# Patient Record
Sex: MALE | Race: WHITE | Employment: OTHER | URBAN - METROPOLITAN AREA
[De-identification: names, ages, dates, MRNs, and addresses within clinical notes are randomized per-mention and may not be internally consistent; named-entity substitution may affect disease eponyms.]

---

## 2023-01-07 ENCOUNTER — APPOINTMENT (OUTPATIENT)
Dept: GENERAL RADIOLOGY | Age: 68
End: 2023-01-07
Payer: COMMERCIAL

## 2023-01-07 ENCOUNTER — HOSPITAL ENCOUNTER (EMERGENCY)
Age: 68
Discharge: HOME | End: 2023-01-07
Attending: EMERGENCY MEDICINE
Payer: COMMERCIAL

## 2023-01-07 VITALS
SYSTOLIC BLOOD PRESSURE: 124 MMHG | TEMPERATURE: 97.8 F | DIASTOLIC BLOOD PRESSURE: 71 MMHG | WEIGHT: 225 LBS | RESPIRATION RATE: 18 BRPM | OXYGEN SATURATION: 93 % | HEART RATE: 59 BPM

## 2023-01-07 DIAGNOSIS — S73.005A DISLOCATION OF LEFT HIP, INITIAL ENCOUNTER (HCC): Primary | ICD-10-CM

## 2023-01-07 PROCEDURE — 2580000003 HC RX 258: Performed by: STUDENT IN AN ORGANIZED HEALTH CARE EDUCATION/TRAINING PROGRAM

## 2023-01-07 PROCEDURE — 73502 X-RAY EXAM HIP UNI 2-3 VIEWS: CPT

## 2023-01-07 PROCEDURE — 96376 TX/PRO/DX INJ SAME DRUG ADON: CPT

## 2023-01-07 PROCEDURE — 6360000002 HC RX W HCPCS: Performed by: STUDENT IN AN ORGANIZED HEALTH CARE EDUCATION/TRAINING PROGRAM

## 2023-01-07 PROCEDURE — 73501 X-RAY EXAM HIP UNI 1 VIEW: CPT

## 2023-01-07 PROCEDURE — 2500000003 HC RX 250 WO HCPCS: Performed by: STUDENT IN AN ORGANIZED HEALTH CARE EDUCATION/TRAINING PROGRAM

## 2023-01-07 PROCEDURE — 99285 EMERGENCY DEPT VISIT HI MDM: CPT

## 2023-01-07 PROCEDURE — 96374 THER/PROPH/DIAG INJ IV PUSH: CPT

## 2023-01-07 PROCEDURE — 96375 TX/PRO/DX INJ NEW DRUG ADDON: CPT

## 2023-01-07 PROCEDURE — 73552 X-RAY EXAM OF FEMUR 2/>: CPT

## 2023-01-07 PROCEDURE — 2500000003 HC RX 250 WO HCPCS

## 2023-01-07 RX ORDER — KETAMINE HYDROCHLORIDE 10 MG/ML
50 INJECTION INTRAMUSCULAR; INTRAVENOUS ONCE
Status: COMPLETED | OUTPATIENT
Start: 2023-01-07 | End: 2023-01-07

## 2023-01-07 RX ORDER — ONDANSETRON 2 MG/ML
4 INJECTION INTRAMUSCULAR; INTRAVENOUS ONCE
Status: COMPLETED | OUTPATIENT
Start: 2023-01-07 | End: 2023-01-07

## 2023-01-07 RX ORDER — 0.9 % SODIUM CHLORIDE 0.9 %
1000 INTRAVENOUS SOLUTION INTRAVENOUS ONCE
Status: COMPLETED | OUTPATIENT
Start: 2023-01-07 | End: 2023-01-07

## 2023-01-07 RX ORDER — FENTANYL CITRATE 50 UG/ML
100 INJECTION, SOLUTION INTRAMUSCULAR; INTRAVENOUS ONCE
Status: COMPLETED | OUTPATIENT
Start: 2023-01-07 | End: 2023-01-07

## 2023-01-07 RX ORDER — MIDAZOLAM HYDROCHLORIDE 2 MG/2ML
2 INJECTION, SOLUTION INTRAMUSCULAR; INTRAVENOUS ONCE
Status: COMPLETED | OUTPATIENT
Start: 2023-01-07 | End: 2023-01-07

## 2023-01-07 RX ORDER — PROPOFOL 10 MG/ML
200 INJECTION, EMULSION INTRAVENOUS CONTINUOUS
Status: DISCONTINUED | OUTPATIENT
Start: 2023-01-07 | End: 2023-01-07 | Stop reason: HOSPADM

## 2023-01-07 RX ORDER — KETAMINE HYDROCHLORIDE 50 MG/ML
INJECTION, SOLUTION, CONCENTRATE INTRAMUSCULAR; INTRAVENOUS
Status: COMPLETED
Start: 2023-01-07 | End: 2023-01-07

## 2023-01-07 RX ORDER — MORPHINE SULFATE 4 MG/ML
4 INJECTION, SOLUTION INTRAMUSCULAR; INTRAVENOUS ONCE
Status: COMPLETED | OUTPATIENT
Start: 2023-01-07 | End: 2023-01-07

## 2023-01-07 RX ADMIN — MORPHINE SULFATE 4 MG: 4 INJECTION INTRAVENOUS at 17:40

## 2023-01-07 RX ADMIN — FENTANYL CITRATE 100 MCG: 50 INJECTION, SOLUTION INTRAMUSCULAR; INTRAVENOUS at 17:37

## 2023-01-07 RX ADMIN — HYDROMORPHONE HYDROCHLORIDE 0.5 MG: 1 INJECTION, SOLUTION INTRAMUSCULAR; INTRAVENOUS; SUBCUTANEOUS at 17:58

## 2023-01-07 RX ADMIN — ONDANSETRON 4 MG: 2 INJECTION INTRAMUSCULAR; INTRAVENOUS at 17:40

## 2023-01-07 RX ADMIN — KETAMINE HYDROCHLORIDE: 50 INJECTION INTRAMUSCULAR; INTRAVENOUS at 20:34

## 2023-01-07 RX ADMIN — HYDROMORPHONE HYDROCHLORIDE 0.5 MG: 1 INJECTION, SOLUTION INTRAMUSCULAR; INTRAVENOUS; SUBCUTANEOUS at 19:37

## 2023-01-07 RX ADMIN — SODIUM CHLORIDE 1000 ML: 9 INJECTION, SOLUTION INTRAVENOUS at 18:18

## 2023-01-07 RX ADMIN — MIDAZOLAM 2 MG: 1 INJECTION INTRAMUSCULAR; INTRAVENOUS at 17:55

## 2023-01-07 RX ADMIN — HYDROMORPHONE HYDROCHLORIDE 0.5 MG: 1 INJECTION, SOLUTION INTRAMUSCULAR; INTRAVENOUS; SUBCUTANEOUS at 18:32

## 2023-01-07 RX ADMIN — PROPOFOL 150 MG: 10 INJECTION, EMULSION INTRAVENOUS at 20:25

## 2023-01-07 RX ADMIN — KETAMINE HYDROCHLORIDE 50 MG: 10 INJECTION INTRAMUSCULAR; INTRAVENOUS at 20:33

## 2023-01-07 ASSESSMENT — PAIN SCALES - GENERAL
PAINLEVEL_OUTOF10: 10

## 2023-01-07 NOTE — ED NOTES
Pt to room 21 via EMS with left hip pain. Pt reports that he was bending down to get coffee and felt his hip pop. Pt reports that he has had hip replacement surgery on his left hip. Pt reports pain to left hip. Pt received 150mcg fentanyl PTA by EMS. Pt alert and oriented x4, talking in complete sentences, respirations even and unlabored. Pt acting age appropriate.  White board updated, will continue to plan of care       Rj Valentin RN  01/07/23 1993

## 2023-01-07 NOTE — CONSULTS
Orthopaedic Surgery Consult  (Dr. Alaina Molina)      CC/Reason for consult:  Left prosthetic Hip Dislocation    HPI:      The patient is a 74 y.o. right hand-dominant male who presents to the emergency department after sustaining a fall. Patient was transferred to the emergency department where radiographs demonstrated a left prosthetic hip dislocation for which we are consulted. Patient seen and evaluated in the emergency department with wife at bedside. Patient states that he was bending down to clean up coffee that he had spilled when he felt his left hip give way and he fell to the floor. Patient denies hitting his head. He denies loss of consciousness. He denies numbness and tingling into his left lower extremity. He denies pain elsewhere in his body. In September patient had a left total hip arthroplasty with Dr. Nasir Kitchen. Patient states he had a right total hip arthroplasty with Dr. Nasir Kitchen approximately 18 years ago. Patient had a left rotator cuff repair completed in October 2022 by Dr. Nasir Kitchen. Patient has a past medical history of COPD, hypertension. Patient takes aspirin 81 mg once daily. At baseline patient is community ambulator without aid. Past Medical History:    No past medical history on file. Past Surgical History:    No past surgical history on file. Medications Prior to Admission:   Prior to Admission medications    Not on File     Allergies:    Patient has no allergy information on record. Social History:   Social History     Socioeconomic History    Marital status:      Family History:  No family history on file. ROS:   Constitutional: Negative for fever and chills. Respiratory: Negative for cough. Cardiovascular: Negative for chest pain. Musculoskeletal: Positive for arthralgias and myalgias of the left lower extremity. Skin: Negative for itching and rash. Neurological: Negative for numbness, tingling, weakness.      PE:  Blood pressure (!) 116/95, pulse 59, temperature 97.8 °F (36.6 °C), resp. rate 18, SpO2 98 %. Gen: Alert and oriented, NAD, cooperative but in pain. Head: Normocephalic, atraumatic. Cardiovascular: regular rate. Respiratory: Chest symmetric, no accessory muscle use. Pelvis: Stability deferred due to hip dislocation. RUE: Skin intact. No ecchymoses, abrasion, deformity, or lacerations. Non tender to palpation. No crepitus. Compartments soft and easily compressible. Full ROM at shoulder with mild pain which is not new. Full ROM at elbow without pain. Ulnar/median/AIN/PIN/radial motor intact. Axillary/MCN/median/ulnar/radial nerves SILT. Radial pulse 2+ with BCR.    LUE: Skin intact. No ecchymoses, abrasion, deformity, or lacerations. Non tender to palpation. No crepitus. Compartments soft and easily compressible. ROM of shoulder deferred due to RTC repair in October. Non tender to palpation about left shoulder. Full ROM at elbow without pain. Ulnar/median/AIN/PIN/radial motor intact. Axillary/MCN/median/ulnar/radial nerves SILT. Radial pulse 2+ with BCR. RLE: Skin intact. No ecchymoses, abrasions, deformity, or lacerations. Non tender to palpation. No crepitus. Compartments soft and easily compressible. EHL/FHL/TA/GS complex motor intact. Sural/saphenous/SPN/DPN/plantar nerve distribution SILT. Patient has no groin pain with log roll maneuver. Knee ligamentous exam deferred due to patient positioning and extreme pain. DP and PT pulses 2+ with BCR. LLE: Skin intact. No ecchymoses, abrasions, deformity, or lacerations. Extremity resting in adducted and internally rotated positioning. Extremely tender to palpation and to slight movement about the hip . No crepitus. Compartments soft and easily compressible. EHL/FHL/TA/GS complex motor intact. Sural/saphenous/SPN/DPN/plantar nerve distribution SILT. Log roll and knee ligamentous exam deferred due to dislocation. DP and PT pulses 2+ with BCR.      Labs:  No results for input(s): WBC, HGB, HCT, PLT, INR, PTT, NA, K, BUN, CREATININE, GLUCOSE, SEDRATE, CRP in the last 72 hours. Invalid input(s): PT     Imaging:   Four radiographic views of the left hip and femur, AP, cross table Lateral demonstrating a periprosthetic hip dislocation. There is no loosing or fracture surrounding the implant. Assessment/Plan: 74 y.o. male who being seen for:    -Left periprosthetic hip dislocation s/p closed reduction    - Closed reduction with conscious sedation in ED  - Knee immobilizer applied to LLE. Please maintain LLE knee immobilizer at all times. - Hip precautions: No hip flexion, adduction, internal rotation. No excessive hip extension, abduction or external rotation. -WB status: WBAT  -Diet: Okay for diet from orthopedic surgery standpoint  -Pain control per Emergency Department  -Ice extremity for pain and swelling  -Dispo: Per ED  -Follow up outpatient with Dr. Cinthia Dominguez  -Please contact ortho with any questions    Procedure Note: Risks, benefits, and alternatives were discussed with the patient, and verbal consent was obtained for procedural sedation with  propofol and ketamine  per the ED staff with reduction of a Left periprosthetic hip replacement. Once adequate sedation had been achieved, reduction maneuver was performed and Hip position was confirmed on X-ray. A Knee immobilizer splint was applied. Patient tolerated procedure well. Torri Childs MD  Resident Physician, PGY-1   Orthopaedic Surgery  6:44 PM 1/7/2023    PGY-3 Addendum    Patient seen and examined. Agree with subjective and objective portions from Dr. Ivory Wright. The patient is a 74 y.o. male with the injuries as listed above. Close reduction under sedation was performed, patient tolerated procedure well. NVI after procedure. Maintain knee immobilizer on. Follow hip precautions as listed above. Ok to discharge and follow up with Dr. Cinthia Dominguez.      Electronically signed by Joann Owen DO 9:12 PM 1/7/2023

## 2023-01-07 NOTE — ED PROVIDER NOTES
[facility]  Emergency Department  Faculty Attestation     I performed a history and physical examination of the patient and discussed management with the resident. I reviewed the residents note and agree with the documented findings and plan of care. Any areas of disagreement are noted on the chart. I was personally present for the key portions of any procedures. I have documented in the chart those procedures where I was not present during the key portions. I have reviewed the emergency nurses triage note. I agree with the chief complaint, past medical history, past surgical history, allergies, medications, social and family history as documented unless otherwise noted below. For Physician Assistant/ Nurse Practitioner cases/documentation I have personally evaluated this patient and have completed at least one if not all key elements of the E/M (history, physical exam, and MDM). Additional findings are as noted. Primary Care Physician:  Lou Turner DO    Screenings:  [unfilled]    CHIEF COMPLAINT       Chief Complaint   Patient presents with    Hip Pain       RECENT VITALS:   Temp: 97.8 °F (36.6 °C),  Heart Rate: 60, Resp: 18, BP: (!) 116/95    LABS:  Labs Reviewed - No data to display    Radiology  XR HIP LEFT (2-3 VIEWS)    (Results Pending)         Attending Physician Additional  Notes    Patient is brought by EMS for severe left hip pain with shortening and internal rotation. He has prior bilateral hip replacements. He was bending over and felt a pop. There is no direct trauma. He believes he may have had a hip dislocation that spontaneously reduced previously. No fall or injury to his head neck chest upper extremities back abdomen or hip. He is on anticoagulation. He is required several doses of analgesics in route.   On exam he is in moderate distress secondary pain, vital signs reveal borderline blood pressure otherwise normal.  No midline cervical thoracic or lumbar spine tenderness. Ribs nontender. Lungs are clear. Abdomen soft nontender. Pelvis nontender. Left thigh is without tenderness or deformity. He has right hip flexion and internal rotation. Normal pulses and sensation in the foot. Impression is probable left hip dislocation. Plan is imaging, analgesics, antiemetics as radiated, anticipate reduction with procedural sedation and orthopedic involvement. Deborah Porter.  Zohra Grimaldo MD, [address] [employer] [facility],[address]  Attending Emergency  Physician                Francois Lam MD  01/07/23 8049

## 2023-01-07 NOTE — ED PROVIDER NOTES
[facility] ED  Emergency Department Encounter  Emergency Medicine Resident     Pt Name: Cheko Brooks  MRN: [id number]  [place] 1955  Date of evaluation: 1/7/23  PCP:  Fabiana Hollis DO    CHIEF COMPLAINT       Chief Complaint   Patient presents with    Hip Pain       HISTORY OFPRESENT ILLNESS  (Location/Symptom, Timing/Onset, Context/Setting, Quality, Duration, Modifying Bud Medal.)      Cheko Brooks is a 74 y.o. male with past medical history significant for left total hip previously with history of dislocations in the past.  Today patient bent over at the waist to  something off the ground and noted that his hip popped out. Previously it has popped back in although this time it did not. 10 out of 10 severe pain. Called EMS. EMS transported. 150 mcg of fentanyl prior to arrival.  No numbness. No tingling. No other injuries. Did not fall. Did not pass out. No loss of conscious. No head trauma. Patient follows with [facility] Orthopedic surgery. PAST MEDICAL / SURGICAL / SOCIAL / FAMILY HISTORY      has no past medical history on file. has no past surgical history on file. Social:      Family Hx: No family history on file. Allergies:  Patient has no allergy information on record. Home Medications:  Prior to Admission medications    Not on File       REVIEW OFSYSTEMS    (2-9 systems for level 4, 10 or more for level 5)      Positive: left hip pain, concern for dislocation.      Negative: fall, LOC, head trauma, numbness, tingling    PHYSICAL EXAM   (up to 7 for level 4, 8 or more forlevel 5)      INITIAL VITALS:   Vitals:    01/07/23 2054   BP: 124/71   Pulse: 59   Resp: 18   Temp:    SpO2: 93%          PHYSICAL EXAM:   Constitutional: Appears to be in pain  HEENT: No outward signs of trauma over the head or neck, neck supple with midline trachea, moving neck freely upon my examination with no rigidity noted, no discharge noted from bilateral eyes. Respiratory: clear to auscultation bilaterally, no wheezes or rales noted, symmetrical chest rise bilaterally with even and unlabored breathing  Chest: no outward signs of trauma over the chest, no tenderness to palpation, no crepitus with palpation of anterior chest wall. Cardiovascular: regular rate and rhythm, radial pulses palpable and equal, 2+ bilaterally, good capillary refill in bilateral upper extremities  Abdomen: soft, nontender, nondistended, non-peritoneal, no outward signs of trauma over the abdomen. Extremities: moving bilateral upper and right lower equally, left lower extremity held in flexion with significant pain with movement and palpation of the left hip, dorsalis pedis and post tibial pulses palpable, sensation in tact. Neurological: alert, answering questions appropriately, easily following commands, speaking in full sentences with no slurring noted     MDM        Initial MDM/Plan: 74 y.o. male who presents via EMS with concerns for hip dislocation. Patient appears to be uncomfortable on initial exam although in no acute distress. Vital signs within normal notes including patient being normotensive, afebrile, heart rate is 59 as expected as patient takes metoprolol daily. Respirations are 18 saturating 93% on room air. Differential to include dislocation versus fracture versus periprosthetic fracture versus other musculoskeletal injury. No other injuries. Did not fall. No head trauma. Will obtain x-ray imaging and discussed with orthopedic surgery team as needed for reduction. Fentanyl initially for symptomatic relief. Morphine for symptomatic relief. Zofran to prevent nausea with opioids. X-rays concern for dislocation. We will plan for procedural sedation, orthopedic surgery team to do reduction while I do sedation.      EMERGENCY DEPARTMENT COURSE:  ED Course as of 01/07/23 2132   Sat Jan 07, 2023   1741 Patient initially registered under the wrong name, new chart started. [MA]   18 Follows with Dr. Kathya Doyle, orthopedic surgery  [MA]   0143 Patient and wife consented for procedural sedation, paperwork in room with chart. [MA]   1832 Ortho team would like more imaging will plan for sedation after repeat imaging. [MA]   2028 Procedural sedation performed, orthopedic surgery team able to reduce left hip. Will plan to monitor in the ED to ensure patient returns to baseline prior to discharge with orthopedic weight bearing and restrictions provided. [MA]   2102 Discussed with Dr. Yuridia Martel who is agreeable to post ED follow up and would like patient to call on Tuesday. [MA]   2125 Reassessed multiple times. Back to baseline. Has tolerated oral intake. Patient and wife both feel comfortable going home. They will follow-up with Dr. Kathya Doyle as well as Dr. Yuridia Martel [MA]   2132 Strict return precautions provided. Patient expresses understanding of discharge instructions and is able to repeat strict return precautions back to me. Patient discharged in stable condition after remained vitally stable throughout emergency department stay. [MA]      ED Course User Index  [MA] Elle Urrutia DO        DIAGNOSTIC RESULTS / EMERGENCYDEPARTMENT COURSE / MDM     LABS:  Labs Reviewed - No data to display      RADIOLOGY:  XR HIP LEFT (1 VIEW)    Result Date: 1/7/2023  EXAMINATION: ONE XRAY VIEW OF THE LEFT HIP; ONE XRAY VIEW OF THE PELVIS AND TWO XRAY VIEWS LEFT HIP; TWO XRAY VIEWS OF THE LEFT FEMUR 1/7/2023 8:15 pm COMPARISON: Radiographs earlier today HISTORY: Post reduction. FINDINGS: Initial attempt at reduction is unsuccessful. However the left hip arthroplasty is successfully relocated on the 2nd attempt. Right hip arthroplasty also appears well aligned. No acute fracture seen. Dislocation of the left hip arthroplasty has been successfully reduced on the 2nd attempt. No acute fracture seen.      XR HIP LEFT (2-3 VIEWS)    Result Date: 1/7/2023  EXAMINATION: TWO XRAY VIEWS OF THE LEFT HIP 1/7/2023 5:49 pm COMPARISON: None. HISTORY: Acute pain, concern for dislocation. FINDINGS: Left hip arthroplasty is dislocated with the femoral component located superior to the acetabular component. Left hip arthroplasty is dislocated with the femoral lobe component located superolateral and posterior to the acetabular component. No acute fracture seen. Soft tissues are unremarkable. Dislocation of the left hip arthroplasty. XR FEMUR LEFT (MIN 2 VIEWS)    Result Date: 1/7/2023  EXAMINATION: ONE XRAY VIEW OF THE LEFT HIP; ONE XRAY VIEW OF THE PELVIS AND TWO XRAY VIEWS LEFT HIP; TWO XRAY VIEWS OF THE LEFT FEMUR 1/7/2023 8:15 pm COMPARISON: Radiographs earlier today HISTORY: Post reduction. FINDINGS: Initial attempt at reduction is unsuccessful. However the left hip arthroplasty is successfully relocated on the 2nd attempt. Right hip arthroplasty also appears well aligned. No acute fracture seen. Dislocation of the left hip arthroplasty has been successfully reduced on the 2nd attempt. No acute fracture seen. XR FEMUR LEFT (MIN 2 VIEWS)    Result Date: 1/7/2023  EXAMINATION: 2 XRAY VIEWS OF THE LEFT FEMUR 1/7/2023 7:04 pm COMPARISON: Correlation with earlier left hip radiographs HISTORY: Left hip arthroplasty dislocation. FINDINGS: Again noted is dislocation of the left hip arthroplasty. No fracture seen. Normal alignment of the knee. Mild osteoarthrosis of the knee without significant joint effusion. Atherosclerotic calcification noted. Left hip arthroplasty dislocation. No fracture seen. XR HIP 2-3 VW W PELVIS LEFT    Result Date: 1/7/2023  EXAMINATION: ONE XRAY VIEW OF THE LEFT HIP; ONE XRAY VIEW OF THE PELVIS AND TWO XRAY VIEWS LEFT HIP; TWO XRAY VIEWS OF THE LEFT FEMUR 1/7/2023 8:15 pm COMPARISON: Radiographs earlier today HISTORY: Post reduction. FINDINGS: Initial attempt at reduction is unsuccessful.   However the left hip arthroplasty is successfully relocated on the 2nd attempt. Right hip arthroplasty also appears well aligned. No acute fracture seen. Dislocation of the left hip arthroplasty has been successfully reduced on the 2nd attempt. No acute fracture seen. PROCEDURES:  PROCEDURE SEDATION NOTE    PATIENT NAME: Jocelyne Ga  MEDICAL RECORD NO. [id number]  DATE: 1/7/2023  ATTENDING PHYSICIAN: Dr. Valentine Stark DIAGNOSIS:  hip dislocation  POSTOPERATIVE DIAGNOSIS:  Same  PROCEDURE PERFORMED:   Procedural Sedation  PERFORMING PHYSICIAN: Cha Rosenthal DO. The attending physician was present and supervising this procedure. DISCUSSION:  Jocelyne Ga is a 74y.o.-year-old male who requires procedural sedation for left hip dislocation reduction. The history and physical examination were reviewed and confirmed. CONSENT: I have discussed with the patient and/or the patient representative the indication, alternatives, and the possible risks and/or complications of the planned procedure and the anesthesia methods. The patient and/or patient representative appear to understand and agree to proceed. PRE-SEDATION DOCUMENTATION AND EXAM: I have personally completed a history, physical exam & review of systems for this patient (see notes). Vital signs have been reviewed (see flow sheet for vitals). Lungs: clear to auscultation bilaterally, Cardiovascular: regular rate and rhythm.     AIRWAY ASSESSMENT: Mallampati Class III - (soft palate & base of uvula are visible)    PRIOR HISTORY OF ANESTHESIA COMPLICATIONS: none    ASA CLASSIFICATION: Class 3 - A patient with severe systemic disease that limits activity but is not incapacitating    SEDATION/ANESTHESIA PLAN: intravenous sedation    MEDICATIONS USED: propofol intravenously and ketamine 150 intravenously    MONITORING AND SAFETY: The patient was placed on a cardiac monitor and vital signs, pulse oximetry and level of consciousness were continuously evaluated throughout the procedure. The patient was closely monitored until recovery from the medications was complete and the patient had returned to baseline status. Respiratory therapy was on standby at all times during the procedure. (The following sections must be completed)  POST-SEDATION VITAL SIGNS: Vital signs were reviewed and were stable after the procedure (see flow sheet for vitals)            POST-SEDATION EXAM: alert, keenly responsive, pleasant, conversational, no supplemental oxygen support, heart regular rate and rhythm, speaking in full sentences, no respiratory distress     COMPLICATIONS: respiratory insufficiency which did resolve quickly although patient did require respiratory support with BVM for about 2 minutes. Prakash Garcia DO  9:32 PM, 1/7/23        CONSULTS:  IP CONSULT TO ORTHOPEDIC SURGERY  IP CONSULT TO PRIMARY CARE PROVIDER      FINAL IMPRESSION      1.  Dislocation of left hip, initial encounter [facility])          DISPOSITION / PLAN     DISPOSITION Decision To Discharge 01/07/2023 09:31:16 PM      PATIENT REFERRED TO:  Tyrone Nixon DO  [address] [facility] [address]  [phone]    Call   the office on Tuesday for post emergency department follow up    Marshal Mcclure, [address] [place] [address], [address]  [facility] [address] [phone]    Call   For [address] [place] [address] Emergency Department Follow Up    [facility] ED  [address] [place] [address]  [phone]    As needed, If symptoms worsen    DISCHARGE MEDICATIONS:  New Prescriptions    No medications on file       Prakash Garcia DO  Emergency Medicine Resident    (Please note that portions of this note were completed with a voice recognition program.Efforts were made to edit the dictations but occasionally words are mis-transcribed.)       Prakash Garcia DO  Resident  01/07/23 1934

## 2023-01-07 NOTE — ED NOTES
Pt physically arrived at 1710. Pt given 100mcg fentanyl @ 1713 per verbal orders by Dr. Lb Ervin.  See [facility] administration      Aly Zepeda RN  01/07/23 1871

## 2023-01-08 NOTE — ED NOTES
Pt given ice chips, tolerating without difficulty. Pt and pts wife at bedside. Dr. Marylu Rubin updates pt/pcg on procedure and outcome. Awaiting ortho residents to speak with pt regarding after care and follow up. Call light within reach. Will cont to monitor.       Valeriano Foley RN  01/07/23 2043       Valeriano Foley RN  01/07/23 2044

## 2023-01-08 NOTE — DISCHARGE INSTRUCTIONS
Orthopaedic Instructions:  -Weight bearing status: Weight bearing as tolerated with the left leg  - Hip precautions   - Keep your toes pointing forward. Do not point your toes excessively inwards or outwards. - Don't rotate your leg too far to the inside. Do not rotate your leg too far outside.   - Do not bend your hip more than 90 degrees. Do not extend your hip backwards. - Keep your knees apart. Don't cross your legs or separate them far apart.   -Always work on toe motion  to decrease swelling.  -Ice (20 minutes on and off 1 hour) to reduce swelling and throbbing pain.  -Call the office or come to Emergency Room if signs of infection appear (hot, swollen, red, draining pus, fever)  -Take medications as prescribed.  -Wean off narcotics (percocet/norco) as soon as possible. Do not take tylenol if still taking narcotics.  -Follow up with  Dr. Gene June  in his office  after discharge . Call [phone] to schedule/confirm. SUMMARY OF YOUR VISIT    Today you were seen for hip dislocation which did require sedation for reeducation. You can utilize Tylenol and Ice for symptomatic relief. Please follow up with Dr. Gene June and Dr. Sandro Sanchez as we discussed.  Below are you restrictions per the orthopedic surgery team.       PLEASE RETURN TO [facility] for worsening symptoms of shortness of breath, wheezing, change in the amount of sputum that you cough up or a change in the color of your sputum, using your inhaler more frequently or if your inhaler only lasts up to 2 hours, or if you develop any concerning symptoms such as: high fever not relieved by acetaminophen (Tylenol) and/or ibuprofen (Motrin / Advil), chills, shortness of breath, chest pain, feeling of your heart fluttering or racing, persistent nausea and/or vomiting, vomiting up blood, blood in your stool, loss of consciousness, numbness, weakness or tingling in the arms or legs or change in color of the extremities, changes in mental status, persistent headache, blurry vision, loss of bladder / bowel control, unable to follow up with your physician, or other any other care or concern. Мария Padron!!! On behalf of the Emergency Department staff at [facility]. [facility] Emergency Department, I would like to thank you for giving [facility] Boone the opportunity to address your health care needs and concerns. We hope that during your visit, our service was delivered in a professional and caring manner. Please keep Cj Boone in mind as we walk with you down the path to your own personal wellness.

## 2023-01-08 NOTE — ED NOTES
Pt awake and alert, asking for water. Writer explains that she would like pt to remain awake for at least 15 minutes prior to giving water. Pt voices understanding.       Vasiliy Mitchell RN  01/07/23 2032

## 2023-01-21 ENCOUNTER — APPOINTMENT (OUTPATIENT)
Dept: GENERAL RADIOLOGY | Age: 68
End: 2023-01-21
Payer: MEDICARE

## 2023-01-21 ENCOUNTER — HOSPITAL ENCOUNTER (EMERGENCY)
Age: 68
Discharge: HOME | End: 2023-01-21
Attending: EMERGENCY MEDICINE
Payer: MEDICARE

## 2023-01-21 VITALS
SYSTOLIC BLOOD PRESSURE: 131 MMHG | RESPIRATION RATE: 14 BRPM | OXYGEN SATURATION: 96 % | DIASTOLIC BLOOD PRESSURE: 67 MMHG | HEART RATE: 56 BPM | HEIGHT: 70 IN | TEMPERATURE: 97.9 F | BODY MASS INDEX: 32.93 KG/M2 | WEIGHT: 230 LBS

## 2023-01-21 DIAGNOSIS — S73.015A POSTERIOR DISLOCATION OF LEFT HIP, INITIAL ENCOUNTER (HCC): Primary | ICD-10-CM

## 2023-01-21 PROCEDURE — 99285 EMERGENCY DEPT VISIT HI MDM: CPT

## 2023-01-21 PROCEDURE — 6360000002 HC RX W HCPCS: Performed by: STUDENT IN AN ORGANIZED HEALTH CARE EDUCATION/TRAINING PROGRAM

## 2023-01-21 PROCEDURE — 6370000000 HC RX 637 (ALT 250 FOR IP): Performed by: STUDENT IN AN ORGANIZED HEALTH CARE EDUCATION/TRAINING PROGRAM

## 2023-01-21 PROCEDURE — 6360000002 HC RX W HCPCS: Performed by: EMERGENCY MEDICINE

## 2023-01-21 PROCEDURE — 96374 THER/PROPH/DIAG INJ IV PUSH: CPT

## 2023-01-21 PROCEDURE — 73502 X-RAY EXAM HIP UNI 2-3 VIEWS: CPT

## 2023-01-21 PROCEDURE — 73552 X-RAY EXAM OF FEMUR 2/>: CPT

## 2023-01-21 PROCEDURE — 6360000002 HC RX W HCPCS

## 2023-01-21 PROCEDURE — 96375 TX/PRO/DX INJ NEW DRUG ADDON: CPT

## 2023-01-21 PROCEDURE — 27250 TREAT HIP DISLOCATION: CPT

## 2023-01-21 RX ORDER — PROPOFOL 10 MG/ML
1 INJECTION, EMULSION INTRAVENOUS ONCE
Status: COMPLETED | OUTPATIENT
Start: 2023-01-21 | End: 2023-01-21

## 2023-01-21 RX ORDER — MORPHINE SULFATE 4 MG/ML
4 INJECTION, SOLUTION INTRAMUSCULAR; INTRAVENOUS ONCE
Status: COMPLETED | OUTPATIENT
Start: 2023-01-21 | End: 2023-01-21

## 2023-01-21 RX ORDER — PROPOFOL 10 MG/ML
INJECTION, EMULSION INTRAVENOUS
Status: DISCONTINUED
Start: 2023-01-21 | End: 2023-01-21 | Stop reason: WASHOUT

## 2023-01-21 RX ORDER — IBUPROFEN 400 MG/1
600 TABLET ORAL ONCE
Status: COMPLETED | OUTPATIENT
Start: 2023-01-21 | End: 2023-01-21

## 2023-01-21 RX ORDER — FENTANYL CITRATE 50 UG/ML
50 INJECTION, SOLUTION INTRAMUSCULAR; INTRAVENOUS ONCE
Status: COMPLETED | OUTPATIENT
Start: 2023-01-21 | End: 2023-01-21

## 2023-01-21 RX ORDER — PROPOFOL 10 MG/ML
INJECTION, EMULSION INTRAVENOUS
Status: DISCONTINUED
Start: 2023-01-21 | End: 2023-01-21 | Stop reason: HOSPADM

## 2023-01-21 RX ORDER — ONDANSETRON 2 MG/ML
4 INJECTION INTRAMUSCULAR; INTRAVENOUS ONCE
Status: COMPLETED | OUTPATIENT
Start: 2023-01-21 | End: 2023-01-21

## 2023-01-21 RX ADMIN — IBUPROFEN 600 MG: 400 TABLET, FILM COATED ORAL at 15:51

## 2023-01-21 RX ADMIN — FENTANYL CITRATE 50 MCG: 50 INJECTION, SOLUTION INTRAMUSCULAR; INTRAVENOUS at 12:36

## 2023-01-21 RX ADMIN — HYDROMORPHONE HYDROCHLORIDE 0.5 MG: 1 INJECTION, SOLUTION INTRAMUSCULAR; INTRAVENOUS; SUBCUTANEOUS at 13:24

## 2023-01-21 RX ADMIN — MORPHINE SULFATE 4 MG: 4 INJECTION INTRAVENOUS at 12:10

## 2023-01-21 RX ADMIN — ONDANSETRON 4 MG: 2 INJECTION INTRAMUSCULAR; INTRAVENOUS at 12:09

## 2023-01-21 RX ADMIN — PROPOFOL 190 MG: 10 INJECTION, EMULSION INTRAVENOUS at 14:55

## 2023-01-21 ASSESSMENT — PAIN DESCRIPTION - LOCATION: LOCATION: HIP

## 2023-01-21 ASSESSMENT — PAIN DESCRIPTION - ORIENTATION: ORIENTATION: LEFT

## 2023-01-21 ASSESSMENT — PAIN - FUNCTIONAL ASSESSMENT: PAIN_FUNCTIONAL_ASSESSMENT: 0-10

## 2023-01-21 NOTE — ED PROVIDER NOTES
[facility]  ED  Emergency Department Encounter  Emergency Medicine Resident     Pt Name:Brent Pickens  MRN: [id number]  [place] 1955  Date of evaluation: 1/21/23  PCP:  Raya Cohen DO  Note Started: 11:57 AM EST    CHIEF COMPLAINT       Chief Complaint   Patient presents with    Hip Pain     Hx of hip replacement, turned wrong and has deformity in left hip. HISTORY OF PRESENT ILLNESS  (Location/Symptom, Timing/Onset, Context/Setting, Quality, Duration, Modifying Factors, Severity.)      Yusuf Vivas is a 74 y.o. male who presents with left hip pain after bending over to pick something off the ground, noting his hip popped out of place. He called EMS as the hip did not go back into place. He has received 100 mcg of fentanyl prior to arrival.  Patient was seen on 1/7/2023 for similar incident. Patient does have history of complete replacement of the hip and was told that he may need a different sized socket.     PAST MEDICAL / SURGICAL / SOCIAL / FAMILY HISTORY     PMH: hip dislocation, COPD, HL, HTN, FIDENCIO    PSH: hip replacement, left rotator cuff repair    Social History     Socioeconomic History    Marital status:      Spouse name: Not on file    Number of children: Not on file    Years of education: Not on file    Highest education level: Not on file   Occupational History    Not on file   Tobacco Use    Smoking status: Not on file    Smokeless tobacco: Not on file   Substance and Sexual Activity    Alcohol use: Not on file    Drug use: Not on file    Sexual activity: Not on file   Other Topics Concern    Not on file   Social History Narrative    Not on file     Social Determinants of Health     Financial Resource Strain: Not on file   Food Insecurity: Not on file   Transportation Needs: Not on file   Physical Activity: Not on file   Stress: Not on file   Social Connections: Not on file   Intimate Partner Violence: Not on file   Housing Stability: Not on file History reviewed. No pertinent family history. Allergies:  Patient has no known allergies. Home Medications:  Prior to Admission medications    Not on File     REVIEW OF SYSTEMS       Review of Systems   Constitutional:  Negative for fever. Musculoskeletal:  Positive for arthralgias, gait problem and myalgias. Skin:  Negative for wound. Neurological:  Negative for syncope. PHYSICAL EXAM      INITIAL VITALS:   /67   Pulse 56   Temp 97.9 °F (36.6 °C) (Oral)   Resp 14   Ht 5' 10\" (1.778 m)   Wt 230 lb (104.3 kg)   SpO2 96%   BMI 33.00 kg/m²     Physical Exam  Constitutional:       General: He is not in acute distress. Comments: Uncomfortable appearing   HENT:      Head: Normocephalic and atraumatic. Mouth/Throat:      Mouth: Mucous membranes are moist.   Eyes:      Extraocular Movements: Extraocular movements intact. Pupils: Pupils are equal, round, and reactive to light. Cardiovascular:      Rate and Rhythm: Normal rate and regular rhythm. Pulses: Normal pulses. Heart sounds: Normal heart sounds. No murmur heard. Pulmonary:      Effort: Pulmonary effort is normal. No respiratory distress. Breath sounds: Normal breath sounds. No wheezing. Musculoskeletal:      Comments: Shortened and slight internal rotation of left leg. DP pulses palpable bilaterally. Neurological:      General: No focal deficit present. Mental Status: He is alert and oriented to person, place, and time. Cranial Nerves: No cranial nerve deficit. 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 Most recently seen in the emergency department on 1/7/2023 for reduction. Did follow up with his orthopedic surgeon on 1/10/2023 who stated patient may need hip revision.   Will provide analgesia as well as Zofran for nausea. Will obtain x-ray imaging to assess type of dislocation of the hip. Given history of hip replacement, contact Ortho for reduction. Will need sedation for reduction. Amount and/or Complexity of Data Reviewed  Independent Historian: EMS  External Data Reviewed: notes. Radiology: ordered. Decision-making details documented in ED Course. Risk  Prescription drug management. Critical Care  Total time providing critical care: < 30 minutes    EKG  Not indicated    All EKG's are interpreted by the Emergency Department Physician who either signs or Co-signs this chart in the absence of a cardiologist.    EMERGENCY DEPARTMENT COURSE:  Patient is a 74year old male with multiple occurences of hip dislocation (post total hip replacement), most recently on 1/7/2023 which required sedation and reduction in the ED. Follow up with his orthopedic surgeon recommended potential revision of the hip. This occurrence occurred when patient bent over to pet his cat. XR imaging obtained and showed posterior/superiolateral dislocation of the hip. Sedation with propofol performed, and hip was reduced by orthopedic surgery team. Patient also placed in knee immobilizer by ortho. Patient returned to baseline post sedation and did ambulate without difficulty. Patient discharged with instructions to follow up with ortho sooner than 6 week which was previously recommended at last visit. ED Course as of 01/21/23 2223   Sat Jan 21, 2023   1557 Patient reevaluated post sedation and is completely back to baseline. States that his hip is sore but pain is much improved from before.   Did reiterate instructions from orthopedic surgery including no hip flexion, abduction or internal rotation. [CP]   1611 Patient ambulated without difficulty  [CP]      ED Course User Index  [CP] Lan Hill MD       PROCEDURES:  Procedural sedation    Date/Time: 1/21/2023 12:12 PM  Performed by: Lan Hill MD  Authorized by: Yajaira June MD     Consent:     Consent obtained:  Written    Consent given by:  Patient    Risks, benefits, and alternatives were discussed: yes      Risks discussed:  Prolonged hypoxia resulting in organ damage, allergic reaction, inadequate sedation, respiratory compromise necessitating ventilatory assistance and intubation, vomiting and nausea  Universal protocol:     Procedure explained and questions answered to patient or proxy's satisfaction: yes      Relevant documents present and verified: yes      Imaging studies available: yes      Patient identity confirmed:  Verbally with patient  Indications:     Procedure performed:  Dislocation reduction    Procedure necessitating sedation performed by:  Different physician    Intended level of sedation:  Moderate  Pre-sedation assessment:     Time since last food or drink:  10pm on 1/20/23    Mouth opening:  3 or more finger widths    Thyromental distance:  3 finger widths    Mallampati score:  II - soft palate, uvula, fauces visible    Neck mobility: normal      Pre-sedation assessments completed and reviewed: airway patency, anesthesia/sedation history, cardiovascular function, hydration status, mental status, nausea/vomiting, pain level, respiratory function and temperature      History of difficult intubation: no      Pre-sedation assessment completed:  1/21/2023 12:13 PM  Immediate pre-procedure details:     Reassessment: Patient reassessed immediately prior to procedure      Reviewed: vital signs      Verified: bag valve mask available, emergency equipment available, intubation equipment available and IV patency confirmed    Procedure details (see MAR for exact dosages):     Preoxygenation:  Nasal cannula    Sedation:  Propofol    Analgesia:  Fentanyl    Intra-procedure monitoring:  Blood pressure monitoring, cardiac monitor, continuous capnometry, continuous pulse oximetry, frequent LOC assessments and frequent vital sign checks    Intra-procedure events: hypoxia and respiratory depression      Intra-procedure management:  Airway repositioning and supplemental oxygen  Post-procedure details:     Attendance: Constant attendance by certified staff until patient recovered      Recovery: Patient returned to pre-procedure baseline      Post-sedation assessments completed and reviewed: airway patency, cardiovascular function, mental status, nausea/vomiting, pain level and respiratory function      Specimens recovered:  None    Patient is stable for discharge or admission: yes      Procedure completion:  Tolerated    CONSULTS:  IP CONSULT TO ORTHOPEDIC SURGERY    CRITICAL CARE:  There was significant risk of life threatening deterioration of patient's condition requiring my direct management. Critical care time 5 minutes, excluding any documented procedures. FINAL IMPRESSION      1. Posterior dislocation of left hip, initial encounter [facility])        DISPOSITION / PLAN     DISPOSITION Decision To Discharge 01/21/2023 03:59:05 PM    PATIENT REFERRED TO:  Randall Modi, [facility] [address], [place] [address]  [phone]    Schedule an appointment as soon as possible for a visit       [facility] ED  [address] [place] [address]  [phone]    As needed, If symptoms worsen    DISCHARGE MEDICATIONS:  There are no discharge medications for this patient.       Maryanne Bishop MD  Emergency Medicine Resident    (Please note that portions of thisnote were completed with a voice recognition program.  Efforts were made to edit the dictations but occasionally words are mis-transcribed.)       Rachel Garcia MD  Resident  01/21/23 9137

## 2023-01-21 NOTE — ED NOTES
Pt to ED via EMS a/o x4 with c/o left hip pain. Pt stated he has a left hip replacement and has been having issues wit it becoming dislocated. Pt stated he bent down to pet his cat when the pin started. Pt denies any fall or head injury. Pt stated he was just here 2 weeks ago for the same thing.  Pt placed on full cardiac monitor, call light is in reach      KISHOR Hair RN  01/21/23 9217

## 2023-01-21 NOTE — ED PROVIDER NOTES
[facility]     Emergency Department     Faculty Attestation    I performed a history and physical examination of the patient and discussed management with the resident. I reviewed the residents note and agree with the documented findings including all diagnostic interpretations and plan of care. Any areas of disagreement are noted on the chart. I was personally present for the key portions of any procedures. I have documented in the chart those procedures where I was not present during the key portions. I have reviewed the emergency nurses triage note. I agree with the chief complaint, past medical history, past surgical history, allergies, medications, social and family history as documented unless otherwise noted below. Documentation of the HPI, Physical Exam and Medical Decision Making performed by scribfarhat is based on my personal performance of the HPI, PE and MDM. For Physician Assistant/ Nurse Practitioner cases/documentation I have personally evaluated this patient and have completed at least one if not all key elements of the E/M (history, physical exam, and MDM). Additional findings are as noted. Primary Care Physician: Christin Varela DO    History: This is a 74 y.o. male who presents to the Emergency Department with complaint of hip pain. History of hip replacement has dislocated several times previously. He reports that he moved wrong and felt hip shift out of place. No numbness no weakness. Did not fall with this. Physical:     height is 5' 10\" (1.778 m) and weight is 230 lb (104.3 kg). His oral temperature is 97.9 °F (36.6 °C). His blood pressure is 113/61 and his pulse is 53. His respiration is 20 and oxygen saturation is 94%.    74 y.o. male no acute distress but appears uncomfortable, left hip appears posterior superiorly dislocated on palpation with some leg discrepancy consistent with this.   Neurovascular intact distally    Medical Decision Making  72-year-old male with history of hip arthroplasty presenting with likely dislocation of hardware. X-rays, analgesia, likely orthopedic consult and procedural sedation with reduction of dislocation    Amount and/or Complexity of Data Reviewed  External Data Reviewed: radiology and notes. Radiology: ordered and independent interpretation performed. Details: X-ray reviewed and appears consistent with posterior superiorDislocation of hip arthroplasty    Risk  Prescription drug management. Parenteral controlled substances. Drug therapy requiring intensive monitoring for toxicity. Minor surgery with identified risk factors.   Risk Details: Procedural sedation for hip reduction          Robert Jimenez MD, [facility]  Attending Emergency Physician        Chel Peoples MD  01/21/23 4623

## 2023-01-21 NOTE — Clinical Note
Noreen Phoenix was seen and treated in our emergency department on 1/21/2023. He may return to work on 01/24/2023. If you have any questions or concerns, please don't hesitate to call.       Francisco Oh MD

## 2023-01-21 NOTE — ED NOTES
Dr Chase Ramirez at bedside getting consent for sedation and reduction of left hip         Alexa Jimenez RN  01/21/23 3545

## 2023-01-21 NOTE — CONSULTS
Orthopaedic Surgery Consult  (Dr. Eduardo Mckinney)      CC/Reason for consult:  Left prosthetic Hip Dislocation     HPI:       The patient is a 74 y.o. right hand-dominant male who presents to the emergency department after sustaining a fall. Patient was transferred to the emergency department where radiographs demonstrated a left prosthetic hip re-dislocation for which we are consulted. Patient seen and evaluated in the emergency department alone. Patient states that he was bending down to up to pet his cat after he took a shower and felt his left hip give way and he fell to the floor. He was unable to ambulate after the incident. Patient last had his prosthetic hip dislocated on 1/7/23, in which is was reduced by orthopedic staff here at [facility]. This would be the third time his hip dislocated while bending down. First hip dislocation was in December. Patient denies hitting his head. He denies loss of consciousness. He denies numbness and tingling into his left lower extremity. He denies pain elsewhere in his body. In September, 2021, patient had a left total hip arthroplasty with Dr. Dwayne Granados. This would be the third dislocation. Patient states he had a right total hip arthroplasty with Dr. Dwayne Granados approximately 18 years ago. Patient had a left rotator cuff repair completed in October 2022 by Dr. Dwayne Granados. Patient has a past medical history of COPD, hypertension. Patient takes aspirin 81 mg once daily. At baseline patient is community ambulator without aid. Past Medical History:    History reviewed. No pertinent past medical history. Past Surgical History:    History reviewed. No pertinent surgical history. Medications Prior to Admission:   Prior to Admission medications    Not on File     Allergies:    Patient has no known allergies.   Social History:   Social History     Socioeconomic History    Marital status:      Spouse name: None    Number of children: None    Years of education: None    Highest education level: None     Family History:  History reviewed. No pertinent family history. ROS:   Constitutional: Negative for fever and chills. Respiratory: Negative for cough. Cardiovascular: Negative for chest pain. Musculoskeletal: Positive for left hip pain. Skin: Negative for itching and rash. Neurological: Negative for numbness, tingling, weakness. PE:  Blood pressure 127/86, pulse 77, temperature 97.9 °F (36.6 °C), temperature source Oral, resp. rate 16, height 5' 10\" (1.778 m), weight 230 lb (104.3 kg), SpO2 96 %. Pelvis: Stability deferred due to hip dislocation. RUE: Skin intact. No ecchymoses, abrasion, deformity, or lacerations. Non tender to palpation. No crepitus. Compartments soft and easily compressible. Full ROM at shoulder with mild pain which is not new. Full ROM at elbow without pain. Ulnar/median/AIN/PIN/radial motor intact. Axillary/MCN/median/ulnar/radial nerves SILT. Radial pulse 2+ with BCR.     LUE: Skin intact. No ecchymoses, abrasion, deformity, or lacerations. Non tender to palpation. No crepitus. Compartments soft and easily compressible. ROM of shoulder deferred due to RTC repair in October. Non tender to palpation about left shoulder. Full ROM at elbow without pain. Ulnar/median/AIN/PIN/radial motor intact. Axillary/MCN/median/ulnar/radial nerves SILT. Radial pulse 2+ with BCR. RLE: Skin intact. No ecchymoses, abrasions, deformity, or lacerations. Non tender to palpation. No crepitus. Compartments soft and easily compressible. EHL/FHL/TA/GS complex motor intact. Sural/saphenous/SPN/DPN/plantar nerve distribution SILT. Patient has no groin pain with log roll maneuver. Knee ligamentous exam deferred due to patient positioning and extreme pain. DP and PT pulses 2+ with BCR. LLE: Skin intact. Previous anterior approach hip incision, well healed with no overt signs of infection.  Extremity resting in adducted and internally rotated positioning. Extremely tender to palpation and to slight movement about the hip . No crepitus. Compartments soft and easily compressible. EHL/FHL/TA/GS complex motor intact. Sural/saphenous/SPN/DPN/plantar nerve distribution SILT. Log roll and knee ligamentous exam deferred due to dislocation. DP and PT pulses 2+ with BCR. Labs:  No results for input(s): WBC, HGB, HCT, PLT, INR, PTT, NA, K, BUN, CREATININE, GLUCOSE, SEDRATE, CRP in the last 72 hours. Invalid input(s): PT   Imaging:   Four radiographic views of the left hip and femur, AP, cross table Lateral demonstrating a periprosthetic hip posterior dislocation. There is no loosing or fracture surrounding the implant. Assessment/Plan: 74 y.o. male who being seen for:     -Left periprosthetic hip dislocation s/p closed reduction     - Closed reduction with conscious sedation in ED  - Knee immobilizer applied to LLE. Please maintain LLE knee immobilizer at all times. - Hip precautions: No hip flexion, adduction, internal rotation. No excessive hip extension, abduction or external rotation. -WB status: WBAT  -Diet: Okay for diet from orthopedic surgery standpoint  -Pain control per Emergency Department  -Ice extremity for pain and swelling  -Dispo: Per ED  -Follow up outpatient with Dr. Greyson Mena  -Please contact ortho with any questions     Procedure Note: Risks, benefits, and alternatives were discussed with the patient, and verbal consent was obtained for procedural sedation with  propofol and ketamine  per the ED staff with reduction of a Left periprosthetic hip replacement. Once adequate sedation had been achieved, reduction maneuver was performed and Hip position was confirmed on X-ray. A Knee immobilizer splint was applied. Patient tolerated procedure well.       Yaneli Joseph,   Orthopedic Surgery Resident, PGY-2  [address]      This note is created with the assistance of a speech recognition program. While intending to generate a document that actually reflects the content of the visit, the document can still have some errors including those of syntax and sound a like substitutions which may escape proof reading. In such instances, actual meaning can be extrapolated by contextual diversion.

## 2023-01-21 NOTE — DISCHARGE INSTRUCTIONS
Orthopaedic Instructions:  -Weight bearing status: Weight bearing as tolerated with the left leg. Do not flex at the hip. Do not adduct (cross midline), or rotate the leg internally (to the inside). -Always look for signs of compartment syndrome: pain out of proportion to the injury, pain not controlled with pain medication, numbness in digits, changing of color of digits (paleness). If these signs occur return to ED immediately for reassessment.  -Ice (20 minutes on and off 1 hour) the hip and elevate above the level of the heart to reduce swelling and throbbing pain.  -Call the office or come to Emergency Room if signs of infection appear (hot, swollen, red, draining pus, fever)  -Take medications as prescribed.   -Follow up with Dr. Juan Alberto Hunt in his office ASAP